# Patient Record
Sex: FEMALE | Race: ASIAN | Employment: UNEMPLOYED | URBAN - METROPOLITAN AREA
[De-identification: names, ages, dates, MRNs, and addresses within clinical notes are randomized per-mention and may not be internally consistent; named-entity substitution may affect disease eponyms.]

---

## 2024-01-27 ENCOUNTER — LAB ENCOUNTER (OUTPATIENT)
Dept: LAB | Age: 44
End: 2024-01-27
Attending: PHYSICIAN ASSISTANT
Payer: COMMERCIAL

## 2024-01-27 ENCOUNTER — OFFICE VISIT (OUTPATIENT)
Dept: FAMILY MEDICINE CLINIC | Facility: CLINIC | Age: 44
End: 2024-01-27

## 2024-01-27 VITALS
BODY MASS INDEX: 21.04 KG/M2 | HEART RATE: 86 BPM | HEIGHT: 58.66 IN | DIASTOLIC BLOOD PRESSURE: 73 MMHG | WEIGHT: 103 LBS | SYSTOLIC BLOOD PRESSURE: 109 MMHG

## 2024-01-27 DIAGNOSIS — E55.9 VITAMIN D DEFICIENCY: ICD-10-CM

## 2024-01-27 DIAGNOSIS — Z00.00 ROUTINE GENERAL MEDICAL EXAMINATION AT A HEALTH CARE FACILITY: Primary | ICD-10-CM

## 2024-01-27 DIAGNOSIS — Z12.11 SCREENING FOR MALIGNANT NEOPLASM OF COLON: ICD-10-CM

## 2024-01-27 DIAGNOSIS — Z00.00 ROUTINE GENERAL MEDICAL EXAMINATION AT A HEALTH CARE FACILITY: ICD-10-CM

## 2024-01-27 DIAGNOSIS — Z86.19 HISTORY OF HELICOBACTER PYLORI INFECTION: ICD-10-CM

## 2024-01-27 DIAGNOSIS — Z01.419 ENCOUNTER FOR ROUTINE GYNECOLOGICAL EXAMINATION WITH PAPANICOLAOU SMEAR OF CERVIX: ICD-10-CM

## 2024-01-27 DIAGNOSIS — Z12.31 ENCOUNTER FOR SCREENING MAMMOGRAM FOR BREAST CANCER: ICD-10-CM

## 2024-01-27 LAB
ALBUMIN SERPL-MCNC: 4.4 G/DL (ref 3.2–4.8)
ALBUMIN/GLOB SERPL: 1.2 {RATIO} (ref 1–2)
ALP LIVER SERPL-CCNC: 91 U/L
ALT SERPL-CCNC: 38 U/L
ANION GAP SERPL CALC-SCNC: 9 MMOL/L (ref 0–18)
APPEARANCE: CLEAR
AST SERPL-CCNC: 30 U/L (ref ?–34)
BASOPHILS # BLD AUTO: 0.01 X10(3) UL (ref 0–0.2)
BASOPHILS NFR BLD AUTO: 0.2 %
BILIRUB SERPL-MCNC: 0.8 MG/DL (ref 0.3–1.2)
BILIRUBIN: NEGATIVE
BUN BLD-MCNC: 6 MG/DL (ref 9–23)
BUN/CREAT SERPL: 10 (ref 10–20)
CALCIUM BLD-MCNC: 9.3 MG/DL (ref 8.7–10.4)
CHLORIDE SERPL-SCNC: 105 MMOL/L (ref 98–112)
CHOLEST SERPL-MCNC: 114 MG/DL (ref ?–200)
CO2 SERPL-SCNC: 25 MMOL/L (ref 21–32)
CREAT BLD-MCNC: 0.6 MG/DL
DEPRECATED RDW RBC AUTO: 39.8 FL (ref 35.1–46.3)
EGFRCR SERPLBLD CKD-EPI 2021: 113 ML/MIN/1.73M2 (ref 60–?)
EOSINOPHIL # BLD AUTO: 0.02 X10(3) UL (ref 0–0.7)
EOSINOPHIL NFR BLD AUTO: 0.5 %
ERYTHROCYTE [DISTWIDTH] IN BLOOD BY AUTOMATED COUNT: 12.5 % (ref 11–15)
FASTING PATIENT LIPID ANSWER: YES
FASTING STATUS PATIENT QL REPORTED: YES
GLOBULIN PLAS-MCNC: 3.6 G/DL (ref 2.8–4.4)
GLUCOSE (URINE DIPSTICK): NEGATIVE MG/DL
GLUCOSE BLD-MCNC: 84 MG/DL (ref 70–99)
HCT VFR BLD AUTO: 35.8 %
HDLC SERPL-MCNC: 48 MG/DL (ref 40–59)
HGB BLD-MCNC: 12.1 G/DL
IMM GRANULOCYTES # BLD AUTO: 0.01 X10(3) UL (ref 0–1)
IMM GRANULOCYTES NFR BLD: 0.2 %
KETONES (URINE DIPSTICK): NEGATIVE MG/DL
LDLC SERPL CALC-MCNC: 52 MG/DL (ref ?–100)
LEUKOCYTES: NEGATIVE
LYMPHOCYTES # BLD AUTO: 1.6 X10(3) UL (ref 1–4)
LYMPHOCYTES NFR BLD AUTO: 36.8 %
MCH RBC QN AUTO: 29.3 PG (ref 26–34)
MCHC RBC AUTO-ENTMCNC: 33.8 G/DL (ref 31–37)
MCV RBC AUTO: 86.7 FL
MONOCYTES # BLD AUTO: 0.34 X10(3) UL (ref 0.1–1)
MONOCYTES NFR BLD AUTO: 7.8 %
MULTISTIX LOT#: NORMAL NUMERIC
NEUTROPHILS # BLD AUTO: 2.37 X10 (3) UL (ref 1.5–7.7)
NEUTROPHILS # BLD AUTO: 2.37 X10(3) UL (ref 1.5–7.7)
NEUTROPHILS NFR BLD AUTO: 54.5 %
NITRITE, URINE: NEGATIVE
NONHDLC SERPL-MCNC: 66 MG/DL (ref ?–130)
OCCULT BLOOD: NEGATIVE
OSMOLALITY SERPL CALC.SUM OF ELEC: 285 MOSM/KG (ref 275–295)
PH, URINE: 7 (ref 4.5–8)
PLATELET # BLD AUTO: 249 10(3)UL (ref 150–450)
POTASSIUM SERPL-SCNC: 3.7 MMOL/L (ref 3.5–5.1)
PROT SERPL-MCNC: 8 G/DL (ref 5.7–8.2)
PROTEIN (URINE DIPSTICK): NEGATIVE MG/DL
RBC # BLD AUTO: 4.13 X10(6)UL
SODIUM SERPL-SCNC: 139 MMOL/L (ref 136–145)
SPECIFIC GRAVITY: 1.01 (ref 1–1.03)
TRIGL SERPL-MCNC: 65 MG/DL (ref 30–149)
TSI SER-ACNC: 4.34 MIU/ML (ref 0.55–4.78)
UROBILINOGEN,SEMI-QN: 0.2 MG/DL (ref 0–1.9)
VIT D+METAB SERPL-MCNC: 36.6 NG/ML (ref 30–100)
VLDLC SERPL CALC-MCNC: 9 MG/DL (ref 0–30)
WBC # BLD AUTO: 4.4 X10(3) UL (ref 4–11)

## 2024-01-27 PROCEDURE — 80061 LIPID PANEL: CPT

## 2024-01-27 PROCEDURE — 36415 COLL VENOUS BLD VENIPUNCTURE: CPT

## 2024-01-27 PROCEDURE — 82306 VITAMIN D 25 HYDROXY: CPT

## 2024-01-27 PROCEDURE — 80053 COMPREHEN METABOLIC PANEL: CPT

## 2024-01-27 PROCEDURE — 84443 ASSAY THYROID STIM HORMONE: CPT

## 2024-01-27 PROCEDURE — 85025 COMPLETE CBC W/AUTO DIFF WBC: CPT

## 2024-01-27 PROCEDURE — 83013 H PYLORI (C-13) BREATH: CPT | Performed by: PHYSICIAN ASSISTANT

## 2024-01-27 NOTE — PROGRESS NOTES
HPI:   Francine Chong is a 43 year old female who presents for an Annual Health Visit.   Patient complains of intermittent epigastric pain for couple months. Patient was diagnosed with H Pylori in November 2023, finished antibiotics which were prescribed from [place].  Patient denies of chest pain, SOB, N/V/C/D, fever, dizziness, syncope. There are no other concerns today.      Allergies:     Allergies   Allergen Reactions    Penicillins DIARRHEA and RASH       CURRENT MEDICATIONS   No current outpatient medications on file.      HISTORICAL INFORMATION   Past Medical History:   Diagnosis Date    SEASONAL ALLERGIES 03/01/09    NASAL CONGESTION EACH SPRING      Past Surgical History:   Procedure Laterality Date    BIOPSY TONGUE,ANTER 2/3 Left 10/21/2015    Procedure: EXCISION TONGUE LESION;  Surgeon: Pam Petty MD;  Location: [facility]    EXCIS TONGUE LESN,ANT 2/3+CLOS Left 10/21/2015    Procedure: EXCISION TONGUE LESION;  Surgeon: Pam Petty MD;  Location: [facility]    EXCIS TONGUE LESN,ANT 2/3+CLOS Left 10/21/2015    Procedure: EXCISION TONGUE LESION;  Surgeon: Pam Petty MD;  Location: [facility]      History reviewed. No pertinent family history.   SOCIAL HISTORY   Social History     Socioeconomic History    Marital status:    Tobacco Use    Smoking status: Never    Smokeless tobacco: Never   Substance and Sexual Activity    Alcohol use: No    Drug use: No     Social History     Social History Narrative    Not on file        REVIEW OF SYSTEMS:     Review of Systems   Constitutional: Negative.    HENT: Negative.     Eyes: Negative.    Respiratory: Negative.     Cardiovascular: Negative.    Gastrointestinal:  Positive for abdominal pain.   Genitourinary: Negative.    Musculoskeletal: Negative.    Skin: Negative.    Neurological: Negative.    Psychiatric/Behavioral: Negative.           EXAM:   /73   Pulse 86   Ht 4' 10.66\" (1.49 m)   Wt 103 lb  (46.7 kg)   BMI 21.04 kg/m²    Wt Readings from Last 6 Encounters:   01/27/24 103 lb (46.7 kg)   10/21/15 99 lb (44.9 kg)   09/21/09 99 lb (44.9 kg)   08/06/09 121 lb (54.9 kg)   08/04/09 123 lb (55.8 kg)   07/27/09 123 lb (55.8 kg)     Body mass index is 21.04 kg/m².    Physical Exam  Vitals reviewed.   Constitutional:       Appearance: Normal appearance. She is well-developed.   HENT:      Head: Normocephalic and atraumatic.      Right Ear: Tympanic membrane, ear canal and external ear normal. There is no impacted cerumen.      Left Ear: Tympanic membrane, ear canal and external ear normal. There is no impacted cerumen.      Nose: Nose normal.      Mouth/Throat:      Mouth: Mucous membranes are moist.      Pharynx: Oropharynx is clear. No oropharyngeal exudate or posterior oropharyngeal erythema.   Eyes:      General:         Right eye: No discharge.         Left eye: No discharge.      Conjunctiva/sclera: Conjunctivae normal.   Cardiovascular:      Rate and Rhythm: Normal rate and regular rhythm.      Heart sounds: Normal heart sounds.   Pulmonary:      Effort: Pulmonary effort is normal.      Breath sounds: Normal breath sounds.   Chest:      Chest wall: No mass, lacerations, deformity, swelling or tenderness.   Breasts:     Breasts are symmetrical.      Right: Normal. No inverted nipple, mass, nipple discharge, skin change or tenderness.      Left: Normal. No inverted nipple, mass, nipple discharge, skin change or tenderness.   Abdominal:      General: Abdomen is flat. Bowel sounds are normal. There is no distension.      Palpations: Abdomen is soft.      Tenderness: There is abdominal tenderness in the epigastric area. There is no right CVA tenderness or left CVA tenderness.   Genitourinary:     Labia:         Right: No rash, tenderness or lesion.         Left: No rash, tenderness or lesion.       Vagina: Normal.      Cervix: Normal.   Musculoskeletal:         General: Normal range of motion.      Cervical  back: Normal range of motion and neck supple.   Lymphadenopathy:      Upper Body:      Right upper body: No supraclavicular or axillary adenopathy.      Left upper body: No supraclavicular or axillary adenopathy.   Skin:     Findings: No rash.   Neurological:      Mental Status: She is alert and oriented to person, place, and time.   Psychiatric:         Behavior: Behavior normal.         Thought Content: Thought content normal.         Judgment: Judgment normal.        ASSESSMENT AND PLAN:   Francine was seen today for new patient and routine physical.    Diagnoses and all orders for this visit:    Routine general medical examination at a health care facility  -     CBC With Differential With Platelet; Future  -     Comp Metabolic Panel (14); Future  -     Lipid Panel; Future  -     TSH W Reflex To Free T4; Future  -     Vitamin D; Future  -     POC Urinalysis, Manual Dip without microscopy [[id number]]    Vitamin D deficiency  -     Vitamin D; Future    History of Helicobacter pylori infection  -     Cancel: H. Pylori Stool Ag, EIA [E]; Future  -     Helicobacter Pylori Breath Test    Encounter for screening mammogram for breast cancer  -     [facility] MUKUND 2D+3D SCREENING BILAT (CPT=77067/37529); Future    Screening for malignant neoplasm of colon  -     Gastro GI Telephone Colon Screen; Future    Encounter for routine gynecological examination with Papanicolaou smear of cervix  -     ThinPrep PAP with HPV Reflex Request B; Future  -     ThinPrep PAP with HPV Reflex Request B  -     ThinPrep PAP with HPV Reflex Request    Overall health discussed, exercise/activity appropriate for age and health status, heathy diety, preventive care, and upcoming screening discussed. Routine labs ordered.    There are no Patient Instructions on file for this visit.    The patient indicates understanding of these issues and agrees to the plan.    Problem List:  Patient Active Problem List   Diagnosis    Throat clearing    Ear discomfort     Tongue mass       Lucia Chong PA-C  1/27/2024  8:36 AM

## 2024-01-29 ENCOUNTER — TELEPHONE (OUTPATIENT)
Dept: FAMILY MEDICINE CLINIC | Facility: CLINIC | Age: 44
End: 2024-01-29

## 2024-01-29 DIAGNOSIS — N64.52 BREAST DISCHARGE: Primary | ICD-10-CM

## 2024-01-29 LAB — H PYLORI BREATH TEST: NEGATIVE

## 2024-01-29 NOTE — TELEPHONE ENCOUNTER
With Spanish [employer]  Miu ID# [id number]    Verified name and .    Patient was seen in office with AJ Kapadia on 24 during which she states mouth ulcers were discussed. She states that she forgot to request prescription to help with the mouth ulcers and is requesting that message be sent to AJ Kapadia to request that prescription be sent to pharmacy on file.    She states that if she is to be called today, to please call this number:  [phone], otherwise, call mobile number on file.    Patient is requesting upper endoscopy order to be added to colonoscopy order placed by AJ Kapadia.     Patient was advised that she will need to schedule the appointment with the gastroenterologist and they will order appropriate testing after evaluation.      Referred to Provider Information:  Provider Address Phone   Shruthi Hinds MD [address] [place] [id number] [phone]       Patient requesting follow up on test results- advised of AJ Kapadia result notes:      Lucia Chong PA-C  2024  9:02 PM CST Back to Top      1. Sugar, liver function, kidney function, cholesterol, vitamin D and thyroid are normal.  2. CBC showed no anemia.       Warm transfer with  to speciality , Maddison, to assist with scheduling.

## 2024-01-29 NOTE — TELEPHONE ENCOUNTER
Call from Cecilia in Central Scheduling ([phone]).  Patient on her line with .  Wants to schedule mammogram  but patient is telling her she has breast implant and also has milk coming out of one breast.  Per supervisor, will need order changed to diagnostic mammogram.  Kang Chong PA-C, please advise?    RN to follow, need to call patient with Romanian .

## 2024-02-05 LAB — HPV I/H RISK 1 DNA SPEC QL NAA+PROBE: NEGATIVE

## 2024-02-14 ENCOUNTER — TELEPHONE (OUTPATIENT)
Dept: FAMILY MEDICINE CLINIC | Facility: CLINIC | Age: 44
End: 2024-02-14

## 2024-02-14 NOTE — TELEPHONE ENCOUNTER
With assist of Kendal   Spoke with patient (identified name and ), results reviewed and agrees with plan.

## 2024-12-13 ENCOUNTER — MOBILE ENCOUNTER (OUTPATIENT)
Dept: FAMILY MEDICINE CLINIC | Facility: CLINIC | Age: 44
End: 2024-12-13

## 2024-12-13 ENCOUNTER — EKG ENCOUNTER (OUTPATIENT)
Dept: LAB | Age: 44
End: 2024-12-13
Attending: PHYSICIAN ASSISTANT
Payer: COMMERCIAL

## 2024-12-13 ENCOUNTER — LAB ENCOUNTER (OUTPATIENT)
Dept: LAB | Age: 44
End: 2024-12-13
Attending: PHYSICIAN ASSISTANT
Payer: COMMERCIAL

## 2024-12-13 ENCOUNTER — OFFICE VISIT (OUTPATIENT)
Dept: FAMILY MEDICINE CLINIC | Facility: CLINIC | Age: 44
End: 2024-12-13

## 2024-12-13 VITALS
HEIGHT: 58.66 IN | DIASTOLIC BLOOD PRESSURE: 79 MMHG | HEART RATE: 76 BPM | SYSTOLIC BLOOD PRESSURE: 110 MMHG | BODY MASS INDEX: 22.27 KG/M2 | WEIGHT: 109 LBS

## 2024-12-13 DIAGNOSIS — R53.1 WEAKNESS: Primary | ICD-10-CM

## 2024-12-13 DIAGNOSIS — R53.1 WEAKNESS: ICD-10-CM

## 2024-12-13 DIAGNOSIS — M62.830 SPASM OF BACK MUSCLES: Primary | ICD-10-CM

## 2024-12-13 DIAGNOSIS — J02.9 SORE THROAT: ICD-10-CM

## 2024-12-13 LAB
ALBUMIN SERPL-MCNC: 5.1 G/DL (ref 3.2–4.8)
ALBUMIN/GLOB SERPL: 1.6 {RATIO} (ref 1–2)
ALP LIVER SERPL-CCNC: 75 U/L
ALT SERPL-CCNC: 19 U/L
ANION GAP SERPL CALC-SCNC: 6 MMOL/L (ref 0–18)
AST SERPL-CCNC: 24 U/L (ref ?–34)
ATRIAL RATE: 63 BPM
BASOPHILS # BLD AUTO: 0.01 X10(3) UL (ref 0–0.2)
BASOPHILS NFR BLD AUTO: 0.2 %
BILIRUB SERPL-MCNC: 0.9 MG/DL (ref 0.3–1.2)
BUN BLD-MCNC: 7 MG/DL (ref 9–23)
BUN/CREAT SERPL: 10.4 (ref 10–20)
CALCIUM BLD-MCNC: 9.8 MG/DL (ref 8.7–10.4)
CHLORIDE SERPL-SCNC: 107 MMOL/L (ref 98–112)
CHOLEST SERPL-MCNC: 110 MG/DL (ref ?–200)
CO2 SERPL-SCNC: 29 MMOL/L (ref 21–32)
CONTROL LINE PRESENT WITH A CLEAR BACKGROUND (YES/NO): YES YES/NO
CREAT BLD-MCNC: 0.67 MG/DL
D DIMER PPP FEU-MCNC: <0.27 UG/ML FEU (ref ?–0.5)
DEPRECATED HBV CORE AB SER IA-ACNC: 275 NG/ML
DEPRECATED RDW RBC AUTO: 39.9 FL (ref 35.1–46.3)
EGFRCR SERPLBLD CKD-EPI 2021: 109 ML/MIN/1.73M2 (ref 60–?)
EOSINOPHIL # BLD AUTO: 0.04 X10(3) UL (ref 0–0.7)
EOSINOPHIL NFR BLD AUTO: 0.8 %
ERYTHROCYTE [DISTWIDTH] IN BLOOD BY AUTOMATED COUNT: 12.5 % (ref 11–15)
FASTING PATIENT LIPID ANSWER: YES
FASTING STATUS PATIENT QL REPORTED: YES
GLOBULIN PLAS-MCNC: 3.2 G/DL (ref 2–3.5)
GLUCOSE BLD-MCNC: 92 MG/DL (ref 70–99)
HCT VFR BLD AUTO: 40.5 %
HDLC SERPL-MCNC: 42 MG/DL (ref 40–59)
HGB BLD-MCNC: 13.4 G/DL
IMM GRANULOCYTES # BLD AUTO: 0.01 X10(3) UL (ref 0–1)
IMM GRANULOCYTES NFR BLD: 0.2 %
KIT LOT #: NORMAL NUMERIC
LDLC SERPL CALC-MCNC: 54 MG/DL (ref ?–100)
LYMPHOCYTES # BLD AUTO: 1.46 X10(3) UL (ref 1–4)
LYMPHOCYTES NFR BLD AUTO: 29.9 %
MCH RBC QN AUTO: 28.9 PG (ref 26–34)
MCHC RBC AUTO-ENTMCNC: 33.1 G/DL (ref 31–37)
MCV RBC AUTO: 87.3 FL
MONOCYTES # BLD AUTO: 0.4 X10(3) UL (ref 0.1–1)
MONOCYTES NFR BLD AUTO: 8.2 %
NEUTROPHILS # BLD AUTO: 2.96 X10 (3) UL (ref 1.5–7.7)
NEUTROPHILS # BLD AUTO: 2.96 X10(3) UL (ref 1.5–7.7)
NEUTROPHILS NFR BLD AUTO: 60.7 %
NONHDLC SERPL-MCNC: 68 MG/DL (ref ?–130)
OSMOLALITY SERPL CALC.SUM OF ELEC: 292 MOSM/KG (ref 275–295)
P AXIS: 57 DEGREES
P-R INTERVAL: 148 MS
PLATELET # BLD AUTO: 279 10(3)UL (ref 150–450)
POTASSIUM SERPL-SCNC: 4 MMOL/L (ref 3.5–5.1)
PROT SERPL-MCNC: 8.3 G/DL (ref 5.7–8.2)
Q-T INTERVAL: 398 MS
QRS DURATION: 78 MS
QTC CALCULATION (BEZET): 407 MS
R AXIS: 71 DEGREES
RBC # BLD AUTO: 4.64 X10(6)UL
SODIUM SERPL-SCNC: 142 MMOL/L (ref 136–145)
STREP GRP A CUL-SCR: NEGATIVE
T AXIS: 38 DEGREES
TRIGL SERPL-MCNC: 66 MG/DL (ref 30–149)
TROPONIN I SERPL HS-MCNC: 7 NG/L
TSI SER-ACNC: 2.23 UIU/ML (ref 0.55–4.78)
VENTRICULAR RATE: 63 BPM
VLDLC SERPL CALC-MCNC: 10 MG/DL (ref 0–30)
WBC # BLD AUTO: 4.9 X10(3) UL (ref 4–11)

## 2024-12-13 PROCEDURE — 80061 LIPID PANEL: CPT

## 2024-12-13 PROCEDURE — 85025 COMPLETE CBC W/AUTO DIFF WBC: CPT

## 2024-12-13 PROCEDURE — 3074F SYST BP LT 130 MM HG: CPT | Performed by: PHYSICIAN ASSISTANT

## 2024-12-13 PROCEDURE — 80053 COMPREHEN METABOLIC PANEL: CPT

## 2024-12-13 PROCEDURE — 84484 ASSAY OF TROPONIN QUANT: CPT

## 2024-12-13 PROCEDURE — 93010 ELECTROCARDIOGRAM REPORT: CPT | Performed by: INTERNAL MEDICINE

## 2024-12-13 PROCEDURE — 84443 ASSAY THYROID STIM HORMONE: CPT

## 2024-12-13 PROCEDURE — 87880 STREP A ASSAY W/OPTIC: CPT | Performed by: PHYSICIAN ASSISTANT

## 2024-12-13 PROCEDURE — 93005 ELECTROCARDIOGRAM TRACING: CPT

## 2024-12-13 PROCEDURE — 3078F DIAST BP <80 MM HG: CPT | Performed by: PHYSICIAN ASSISTANT

## 2024-12-13 PROCEDURE — 99214 OFFICE O/P EST MOD 30 MIN: CPT | Performed by: PHYSICIAN ASSISTANT

## 2024-12-13 PROCEDURE — 85379 FIBRIN DEGRADATION QUANT: CPT

## 2024-12-13 PROCEDURE — 36415 COLL VENOUS BLD VENIPUNCTURE: CPT

## 2024-12-13 PROCEDURE — 82728 ASSAY OF FERRITIN: CPT

## 2024-12-13 PROCEDURE — 3008F BODY MASS INDEX DOCD: CPT | Performed by: PHYSICIAN ASSISTANT

## 2024-12-13 RX ORDER — NAPROXEN 500 MG/1
500 TABLET ORAL 2 TIMES DAILY WITH MEALS
Qty: 60 TABLET | Refills: 0 | Status: SHIPPED | OUTPATIENT
Start: 2024-12-13

## 2024-12-13 NOTE — PROGRESS NOTES
HPI:     HPI  A 44-year-old woman presents with fatigue and numbness in her right arm and leg that have persisted for the past week. She also reported experiencing throat irritation and upper back pain for a few days.    The patient denies experiencing chest pain, shortness of breath, nausea, vomiting, constipation, diarrhea, fever, dizziness, fainting, seizures, slurred speech, headache, changes in mental status, or abdominal pain. She has no other concerns today.    Medications:     Current Outpatient Medications   Medication Sig Dispense Refill    naproxen 500 MG Oral Tab Take 1 tablet (500 mg total) by mouth 2 (two) times daily with meals. 60 tablet 0       Allergies:   Allergies[1]    History:     Health Maintenance   Topic Date Due    Colorectal Cancer Screening  Never done    Mammogram  Never done    COVID-19 Vaccine (4 - 2024-25 season) 09/01/2024    Influenza Vaccine (1) Never done    Annual Physical  01/27/2025    DTaP,Tdap,and Td Vaccines (1 - Tdap) 01/27/2025 (Originally 9/27/1997)    Pap Smear  01/27/2027    Annual Depression Screening  Completed    Pneumococcal Vaccine: Birth to 64yrs  Aged Out       No LMP recorded. (Menstrual status: Other).   Past Medical History:     Past Medical History:    SEASONAL ALLERGIES    NASAL CONGESTION EACH SPRING       Past Surgical History:     Past Surgical History:   Procedure Laterality Date    Biopsy tongue,anter 2/3 Left 10/21/2015    Procedure: EXCISION TONGUE LESION;  Surgeon: Pam Petty MD;  Location: [facility]    Excis tongue lesn,ant 2/3+clos Left 10/21/2015    Procedure: EXCISION TONGUE LESION;  Surgeon: Pam Petty MD;  Location: [facility]    Excis tongue lesn,ant 2/3+clos Left 10/21/2015    Procedure: EXCISION TONGUE LESION;  Surgeon: Pam Petty MD;  Location: [facility]       Family History:   History reviewed. No pertinent family history.    Social History:     Social History     Socioeconomic  History    Marital status:      Spouse name: Not on file    Number of children: Not on file    Years of education: Not on file    Highest education level: Not on file   Occupational History    Not on file   Tobacco Use    Smoking status: Never    Smokeless tobacco: Never   Substance and Sexual Activity    Alcohol use: No    Drug use: No    Sexual activity: Not on file   Other Topics Concern    Not on file   Social History Narrative    Not on file     Social Drivers of Health     Financial Resource Strain: Not on file   Food Insecurity: Not on file   Transportation Needs: Not on file   Physical Activity: Not on file   Stress: Not on file   Social Connections: Not on file   Housing Stability: Not on file       Review of Systems:   Review of Systems   Constitutional:  Negative for activity change, chills, fatigue and fever.   HENT:  Negative for congestion, ear discharge, ear pain, postnasal drip, rhinorrhea, sinus pressure, sinus pain and sore throat.    Respiratory:  Negative for cough, chest tightness, shortness of breath and wheezing.    Cardiovascular:  Negative for chest pain and palpitations.   Gastrointestinal:  Negative for abdominal distention, abdominal pain, blood in stool, constipation, diarrhea, nausea and vomiting.   Skin:  Negative for rash.   Neurological:  Positive for numbness. Negative for dizziness, seizures, syncope, weakness, light-headedness and headaches.        Vitals:    12/13/24 0824   BP: 110/79   Pulse: 76   Weight: 109 lb (49.4 kg)   Height: 4' 10.66\" (1.49 m)     Body mass index is 22.27 kg/m².    Physical Exam:   Physical Exam  Vitals reviewed.   Constitutional:       General: She is not in acute distress.     Appearance: She is well-developed.   HENT:      Right Ear: Tympanic membrane, ear canal and external ear normal. There is no impacted cerumen.      Left Ear: Tympanic membrane, ear canal and external ear normal. There is no impacted cerumen.      Nose: Nose normal.       Mouth/Throat:      Mouth: Mucous membranes are moist.      Pharynx: Oropharynx is clear. No oropharyngeal exudate or posterior oropharyngeal erythema.   Eyes:      General:         Right eye: No discharge.         Left eye: No discharge.      Conjunctiva/sclera: Conjunctivae normal.   Cardiovascular:      Rate and Rhythm: Normal rate and regular rhythm.      Heart sounds: Normal heart sounds, S1 normal and S2 normal. No murmur heard.  Pulmonary:      Effort: Pulmonary effort is normal.      Breath sounds: Normal breath sounds. No wheezing or rales.   Chest:      Chest wall: No tenderness.   Lymphadenopathy:      Cervical: No cervical adenopathy.   Skin:     Findings: No rash.   Neurological:      Mental Status: She is alert and oriented to person, place, and time.   Psychiatric:         Behavior: Behavior is cooperative.      Inspection: Neck and spine have no noted deformities or signs of inflammation. Curvature of cervical, thoracic, and lumbar spine are within normal limits. Posture is upright, and gait is smooth and normal.  Palpation: Spinous processed of C7-L5 palpable, midline, and non- tender. Back muscles are tight, and tenderness between shoulder blade.  Flexion, extension, and side to side rotation of cervical spine causes no discomfort.  CN II-XII: grossly intact.  Muscle strength: + 5/5 upper and lower extremities Bl.    Visual fields: intact.        Assessment and Plan::     Problem List Items Addressed This Visit    None  Visit Diagnoses       Spasm of back muscles    -  Primary    Relevant Medications    naproxen 500 MG Oral Tab    Sore throat        Relevant Orders    POC Rapid Strep [12929] (Completed)    Weakness        Relevant Orders    EKG 12 Lead (Completed)    CBC With Differential With Platelet (Completed)    Comp Metabolic Panel (14) (Completed)    TSH W Reflex To Free T4 (Completed)    Lipid Panel (Completed)    D-Dimer [E] (Completed)    Troponin I (High Sensitivity) [E] (Completed)         Supportive care includes:    encourage fluid intake   Advise patient to take Tylenol/Ibuprofen as needed for pain.  warm salt water gargles   Advise the patient to proceed to ER if symptoms worsen.    Discussed plan of care with pt and pt is in agreement.All questions answered. Pt to call with questions or concerns.         [1]   Allergies  Allergen Reactions    Penicillins DIARRHEA and RASH

## 2024-12-14 ENCOUNTER — TELEPHONE (OUTPATIENT)
Dept: FAMILY MEDICINE CLINIC | Facility: CLINIC | Age: 44
End: 2024-12-14

## 2024-12-14 NOTE — TELEPHONE ENCOUNTER
LATE ENTRY  ON CALL MD  Received page re: labs per   Inquiring about results, states he missed phone call  Reviewed normal results.  Requesting iron due to h/o low iron, pale appearance  Added -- reported normal today  Encouraged continued rest and supportive care  If no improvement over next 3-5 days, encouraged to follow-up.   verbalized understanding and agrees with plan.

## 2024-12-16 ENCOUNTER — TELEPHONE (OUTPATIENT)
Dept: INTERNAL MEDICINE CLINIC | Facility: CLINIC | Age: 44
End: 2024-12-16

## 2024-12-16 NOTE — TELEPHONE ENCOUNTER
Patient had 12/13/24. She would like to discuss neck pain with [facility]. Declined triage, declined visit. She is just asking for call back from [facility] [phone]. Requesting call back asap, marking high priority

## 2024-12-17 NOTE — TELEPHONE ENCOUNTER
Called and left message for patient to call back. Please transfer [facility] ext.

## 2024-12-19 ENCOUNTER — TELEPHONE (OUTPATIENT)
Dept: FAMILY MEDICINE CLINIC | Facility: CLINIC | Age: 44
End: 2024-12-19

## 2024-12-19 NOTE — TELEPHONE ENCOUNTER
CT of cervical result received from [facility].  Please schedule to discuss.

## 2024-12-19 NOTE — TELEPHONE ENCOUNTER
First call attempt.   Call attempts to number on verbal release . No answer. No option for voicemail. Tried twice.   [employer] Message sent.

## 2024-12-19 NOTE — TELEPHONE ENCOUNTER
Per alternative encounter     December 19, 2024  Cindy CAZARES RN JP    12/19/24 10:23 AM  Note     First call attempt.   Call attempts to number on verbal release . No answer. No option for voicemail. Tried twice.   [employer] Message sent.         Notified via [employer].  Postponed to confirm patient has reviewed message.

## 2024-12-20 NOTE — TELEPHONE ENCOUNTER
Note patient has read our [employer] message. No further action is needed.  Last read by Francine Chong at 12:59 AM on 12/20/2024.

## 2024-12-23 NOTE — TELEPHONE ENCOUNTER
Patient read my chart:    From  April JOHN Puentes To  Francine Chong Sent and Delivered  12/19/2024  3:57 PM   Last Read in MyChart  12/19/2024  4:23 PM by Francine Chong